# Patient Record
Sex: MALE | Race: WHITE | NOT HISPANIC OR LATINO | Employment: OTHER | URBAN - METROPOLITAN AREA
[De-identification: names, ages, dates, MRNs, and addresses within clinical notes are randomized per-mention and may not be internally consistent; named-entity substitution may affect disease eponyms.]

---

## 2019-11-01 ENCOUNTER — TELEPHONE (OUTPATIENT)
Dept: SCHEDULING | Facility: IMAGING CENTER | Age: 29
End: 2019-11-01

## 2019-11-12 ENCOUNTER — OFFICE VISIT (OUTPATIENT)
Dept: MEDICAL GROUP | Facility: CLINIC | Age: 29
End: 2019-11-12
Payer: MEDICAID

## 2019-11-12 VITALS
TEMPERATURE: 98.7 F | BODY MASS INDEX: 16.66 KG/M2 | HEART RATE: 62 BPM | RESPIRATION RATE: 16 BRPM | DIASTOLIC BLOOD PRESSURE: 72 MMHG | WEIGHT: 119 LBS | HEIGHT: 71 IN | OXYGEN SATURATION: 97 % | SYSTOLIC BLOOD PRESSURE: 112 MMHG

## 2019-11-12 DIAGNOSIS — G43.819 OTHER MIGRAINE WITHOUT STATUS MIGRAINOSUS, INTRACTABLE: ICD-10-CM

## 2019-11-12 DIAGNOSIS — Z00.00 ENCOUNTER FOR MEDICAL EXAMINATION TO ESTABLISH CARE: ICD-10-CM

## 2019-11-12 DIAGNOSIS — K21.9 GASTROESOPHAGEAL REFLUX DISEASE, ESOPHAGITIS PRESENCE NOT SPECIFIED: ICD-10-CM

## 2019-11-12 DIAGNOSIS — Z23 NEED FOR VACCINATION: ICD-10-CM

## 2019-11-12 DIAGNOSIS — R56.1 SEIZURE AFTER HEAD INJURY (HCC): ICD-10-CM

## 2019-11-12 PROBLEM — G43.909 MIGRAINE: Status: ACTIVE | Noted: 2019-11-12

## 2019-11-12 PROCEDURE — 90471 IMMUNIZATION ADMIN: CPT | Performed by: PHYSICIAN ASSISTANT

## 2019-11-12 PROCEDURE — 99204 OFFICE O/P NEW MOD 45 MIN: CPT | Mod: 25 | Performed by: PHYSICIAN ASSISTANT

## 2019-11-12 PROCEDURE — 90715 TDAP VACCINE 7 YRS/> IM: CPT | Performed by: PHYSICIAN ASSISTANT

## 2019-11-12 RX ORDER — METHOCARBAMOL 500 MG/1
1000 TABLET, FILM COATED ORAL 2 TIMES DAILY PRN
Qty: 60 TAB | Refills: 2 | Status: SHIPPED | OUTPATIENT
Start: 2019-11-12 | End: 2021-03-11

## 2019-11-12 RX ORDER — PRAZOSIN HYDROCHLORIDE 1 MG/1
1 CAPSULE ORAL NIGHTLY
COMMUNITY
End: 2021-11-01

## 2019-11-12 RX ORDER — PANTOPRAZOLE SODIUM 40 MG/1
40 TABLET, DELAYED RELEASE ORAL DAILY
Qty: 30 TAB | Refills: 1 | Status: SHIPPED | OUTPATIENT
Start: 2019-11-12 | End: 2019-11-26

## 2019-11-12 NOTE — ASSESSMENT & PLAN NOTE
"This is a chronic condition, new to this provider.  This patient does take ibuprofen on a daily basis, does reduce the \"red foods\" in his diet and does sit up for about 1 hour after eating in order to prevent worsening heartburn symptoms.  He states this was well controlled on ranitidine in the past and has not had medicine in the past couple of months for this.  "
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
## 2019-11-12 NOTE — ASSESSMENT & PLAN NOTE
This patient is from [place], [place] and has been unable to establish with a primary care provider recently due to access and insurance issues.  He requests to establish care at this time despite living over an hour drive from this clinic.
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
## 2019-11-12 NOTE — NON-PROVIDER
Migraines most days of the month  Sumatriptan and rescue      D/c prozac, fluoxatine, oxcarbazepine    Tetanus, no flu

## 2019-11-12 NOTE — ASSESSMENT & PLAN NOTE
This is a chronic condition, new to this provider.  This patient states that over the past few months, he has been experiencing nearly daily headaches that are not currently well controlled with ibuprofen and Tylenol.  He has taken topiramate in the past without good relief of his headaches.  He is also tried sumatriptan and which she feels did help alleviate his headaches.  He has tried topiramate in the past without good headache relief and does use marijuana for some relief of this problem.  He states his headache starts with a tightness sensation in the back of his neck and shoulders and is followed by photophobia, phonophobia and sensitivity to all stimulation.

## 2019-12-23 ENCOUNTER — OFFICE VISIT (OUTPATIENT)
Dept: MEDICAL GROUP | Facility: CLINIC | Age: 29
End: 2019-12-23
Payer: MEDICAID

## 2019-12-23 VITALS
WEIGHT: 118.8 LBS | OXYGEN SATURATION: 98 % | RESPIRATION RATE: 16 BRPM | HEART RATE: 90 BPM | BODY MASS INDEX: 16.63 KG/M2 | HEIGHT: 71 IN | TEMPERATURE: 98.5 F | SYSTOLIC BLOOD PRESSURE: 108 MMHG | DIASTOLIC BLOOD PRESSURE: 68 MMHG

## 2019-12-23 DIAGNOSIS — F17.200 SMOKES AND MOTIVATED TO QUIT: ICD-10-CM

## 2019-12-23 DIAGNOSIS — R07.81 PLEURODYNIA: ICD-10-CM

## 2019-12-23 DIAGNOSIS — Z20.5 EXPOSURE TO HEPATITIS B: ICD-10-CM

## 2019-12-23 DIAGNOSIS — G43.819 OTHER MIGRAINE WITHOUT STATUS MIGRAINOSUS, INTRACTABLE: ICD-10-CM

## 2019-12-23 PROCEDURE — 99214 OFFICE O/P EST MOD 30 MIN: CPT | Performed by: PHYSICIAN ASSISTANT

## 2019-12-23 RX ORDER — SUMATRIPTAN 25 MG/1
25-100 TABLET, FILM COATED ORAL
Qty: 10 TAB | Refills: 3 | Status: SHIPPED | OUTPATIENT
Start: 2019-12-23 | End: 2021-03-11

## 2019-12-23 ASSESSMENT — PAIN SCALES - GENERAL: PAINLEVEL: NO PAIN

## 2019-12-23 ASSESSMENT — PATIENT HEALTH QUESTIONNAIRE - PHQ9: CLINICAL INTERPRETATION OF PHQ2 SCORE: 0

## 2019-12-23 NOTE — PROGRESS NOTES
Chief Complaint   Patient presents with   • Paperwork     DMV   • Headache       HISTORY OF PRESENT ILLNESS: Patient is a 29 y.o. male established patient who presents today for evaluation and management of:    Smokes and motivated to quit  This patient has been a chronic smoker for some time now.  He does wish to quit and has previously tried patches and behavioral therapy without success.    Pleurodynia  This patient continues having episodes of severe chest pain on the left side that feel like a stabbing sensation and last for about 20 or 30 seconds and then go away.  He states that this pain seems to take his breath away but he is not short of breath when standing or walking during or after these attacks.  He states that the worst attack occurred the other night when he was sleeping and woke him from his sleep.  He has tried methocarbamol, ibuprofen and Tylenol with some relief of this    Migraine  This is a chronic condition, new to this provider.  This patient states that over the past few months, he has been experiencing nearly daily headaches that are not currently well controlled with ibuprofen and Tylenol.  He has taken topiramate in the past without good relief of his headaches.  He is also tried sumatriptan and which she feels did help alleviate his headaches.  He does use marijuana for some relief of this problem.  He states his headache starts with a tightness sensation in the back of his neck and shoulders and is followed by photophobia, phonophobia and sensitivity to all stimulation.  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    Current Outpatient Medications   Medication Sig Dispense Refill   • SUMAtriptan (IMITREX) 25 MG Tab tablet Take 1-4 Tabs by mouth Once PRN for Migraine for up to 1 dose. 10 Tab 3   • varenicline (CHANTIX STARTING MONTH TERI) 0.5 MG X 11 & 1 MG X 42 tablet Take 0.5 tab PO QAM for 4 days  Then, 0.5 tab PO BID for three days then, 1.0 tab PO BId thereafter. 56 Tab 3   • prazosin (MINIPRESS) 1 MG Cap Take 1 mg by mouth every evening.     • methocarbamol (ROBAXIN) 500 MG Tab Take 2 Tabs by mouth 2 times a day as needed. 60 Tab 2     No current facility-administered medications for this visit.        Social History     Tobacco Use   • Smoking status: Current Every Day Smoker     Packs/day: 2.00     Years: 10.00     Pack years: 20.00     Types: Cigarettes   • Smokeless tobacco: Never Used   Substance Use Topics   • Alcohol use: Not Currently   • Drug use: Yes     Frequency: 70.0 times per week     Types: Marijuana, Inhaled       Family Status   Relation Name Status   • Mo  Alive   • Sis  Alive   • Bro  Alive   • Bro  Alive   • Child  Alive   • Child  Alive     Family History   Problem Relation Age of Onset   • Drug abuse Mother    • Psychiatric Illness Brother         shizophrenia       Review of Systems: See HPI above.   Constitutional: Negative for fever, chills, weight loss and malaise.   HENT: Negative for ear pain, nosebleeds, congestion, sore throat and neck pain.    Eyes: Negative for blurred vision.   Respiratory: Negative for cough, sputum production and wheezing.    Cardiovascular: Negative for palpitations, orthopnea and leg swelling.   Gastrointestinal: Negative for nausea, vomiting and abdominal pain.   Genitourinary: Negative for  "dysuria, urgency and frequency.   Musculoskeletal: Negative for myalgias, back pain and joint pain.   Skin: Negative for rash and itching.   Neurological: Negative for dizziness, tingling, tremors, sensory change, focal weakness and positive for  headaches.       Exam:  /68 (BP Location: Right arm, Patient Position: Sitting, BP Cuff Size: Adult)   Pulse 90   Temp 36.9 °C (98.5 °F) (Temporal)   Resp 16   Ht 1.803 m (5' 11\")   Wt 53.9 kg (118 lb 12.8 oz)   SpO2 98%   Body mass index is 16.57 kg/m².  General:  Thin male in NAD  Head: is grossly normal.  Neck: Supple without masses. Thyroid is not visibly enlarged.  Pulmonary: Clear to ausculation. Normal effort. No rales, ronchi, or wheezing.  Cardiovascular: Regular rate and rhythm without murmur. Carotid pulses are intact and equal bilaterally.  Extremities: no clubbing, cyanosis, or edema.    Medical decision-making and discussion:  1. Exposure to hepatitis B    - HEPATITIS PANEL ACUTE(4 COMPONENTS); Future    2. Pleurodynia    - HOLTER - Cardiology Performed (48HR); Future  - DX-CHEST-2 VIEWS; Future    3. Other migraine without status migrainosus, intractable    - SUMAtriptan (IMITREX) 25 MG Tab tablet; Take 1-4 Tabs by mouth Once PRN for Migraine for up to 1 dose.  Dispense: 10 Tab; Refill: 3    4. Smokes and motivated to quit    - varenicline (CHANTIX STARTING MONTH TERI) 0.5 MG X 11 & 1 MG X 42 tablet; Take 0.5 tab PO QAM for 4 days  Then, 0.5 tab PO BID for three days then, 1.0 tab PO BId thereafter.  Dispense: 56 Tab; Refill: 3      Please note that this dictation was created using voice recognition software. I have made every reasonable attempt to correct obvious errors, but I expect that there are errors of grammar and possibly content that I did not discover before finalizing the note.      Return in about 4 weeks (around 1/20/2020) for headaches and smoking.  "

## 2019-12-23 NOTE — ASSESSMENT & PLAN NOTE
This is a chronic condition, new to this provider.  This patient states that over the past few months, he has been experiencing nearly daily headaches that are not currently well controlled with ibuprofen and Tylenol.  He has taken topiramate in the past without good relief of his headaches.  He is also tried sumatriptan and which she feels did help alleviate his headaches.  He does use marijuana for some relief of this problem.  He states his headache starts with a tightness sensation in the back of his neck and shoulders and is followed by photophobia, phonophobia and sensitivity to all stimulation.  He prefers to retry sumatriptan at this time

## 2019-12-23 NOTE — ASSESSMENT & PLAN NOTE
This patient has been a chronic smoker for some time now.  He does wish to quit and has previously tried patches and behavioral therapy without success.

## 2019-12-23 NOTE — ASSESSMENT & PLAN NOTE
This patient continues having episodes of severe chest pain on the left side that feel like a stabbing sensation and last for about 20 or 30 seconds and then go away.  He states that this pain seems to take his breath away but he is not short of breath when standing or walking during or after these attacks.  He states that the worst attack occurred the other night when he was sleeping and woke him from his sleep.  He has tried methocarbamol, ibuprofen and Tylenol with some relief of this

## 2019-12-23 NOTE — ASSESSMENT & PLAN NOTE
Since this patient was in [facility], he is aware that hepatitis B is commonly transmitted and presents and wishes to be tested for this at this time.

## 2019-12-31 ENCOUNTER — PATIENT MESSAGE (OUTPATIENT)
Dept: MEDICAL GROUP | Facility: CLINIC | Age: 29
End: 2019-12-31

## 2019-12-31 DIAGNOSIS — Z72.51 HIGH RISK SEXUAL BEHAVIOR, UNSPECIFIED TYPE: ICD-10-CM

## 2020-01-01 NOTE — TELEPHONE ENCOUNTER
From: Jose Alejandro Bone  To: Laly Maxwell P.A.-C.  Sent: 12/31/2019 3:26 PM PST  Subject: Non-Urgent Medical Question    Are you gonna be able to get a lab order for Hep tests? If so is it possible just to have a full STD and HIV test at the same time?    Only 1 I really am concerned with is Hep B because of [facility].     [place]

## 2021-03-11 ENCOUNTER — OFFICE VISIT (OUTPATIENT)
Dept: MEDICAL GROUP | Facility: MEDICAL CENTER | Age: 31
End: 2021-03-11
Attending: PHYSICIAN ASSISTANT
Payer: MEDICAID

## 2021-03-11 VITALS
DIASTOLIC BLOOD PRESSURE: 60 MMHG | HEART RATE: 92 BPM | SYSTOLIC BLOOD PRESSURE: 106 MMHG | TEMPERATURE: 98.7 F | RESPIRATION RATE: 16 BRPM | BODY MASS INDEX: 17.31 KG/M2 | WEIGHT: 120.9 LBS | HEIGHT: 70 IN | OXYGEN SATURATION: 98 %

## 2021-03-11 DIAGNOSIS — R07.9 CHRONIC CHEST PAIN: ICD-10-CM

## 2021-03-11 DIAGNOSIS — Z02.89 ENCOUNTER FOR COMPLETION OF FORM WITH PATIENT: ICD-10-CM

## 2021-03-11 DIAGNOSIS — G89.29 CHRONIC CHEST PAIN: ICD-10-CM

## 2021-03-11 PROCEDURE — 99214 OFFICE O/P EST MOD 30 MIN: CPT | Performed by: PHYSICIAN ASSISTANT

## 2021-03-11 PROCEDURE — 99203 OFFICE O/P NEW LOW 30 MIN: CPT | Performed by: PHYSICIAN ASSISTANT

## 2021-03-11 RX ORDER — HYDROXYZINE PAMOATE 25 MG/1
CAPSULE ORAL
COMMUNITY
Start: 2021-02-14 | End: 2021-11-01

## 2021-03-11 RX ORDER — VENLAFAXINE HYDROCHLORIDE 150 MG/1
CAPSULE, EXTENDED RELEASE ORAL
COMMUNITY
Start: 2021-03-04 | End: 2021-11-01

## 2021-03-11 RX ORDER — VENLAFAXINE HYDROCHLORIDE 75 MG/1
CAPSULE, EXTENDED RELEASE ORAL
COMMUNITY
Start: 2021-02-14 | End: 2021-03-11

## 2021-03-11 RX ORDER — MIRTAZAPINE 15 MG/1
TABLET, FILM COATED ORAL
COMMUNITY
Start: 2021-02-14 | End: 2021-11-01

## 2021-03-11 ASSESSMENT — ENCOUNTER SYMPTOMS
WEAKNESS: 0
DOUBLE VISION: 0
BLURRED VISION: 0
CHILLS: 0
DIZZINESS: 0
BLOOD IN STOOL: 0
SHORTNESS OF BREATH: 1
WHEEZING: 0
CLAUDICATION: 0
COUGH: 0
PALPITATIONS: 0
CONSTIPATION: 0
TINGLING: 0
FEVER: 0
DIARRHEA: 0
WEIGHT LOSS: 0
VOMITING: 0
PHOTOPHOBIA: 0
HEADACHES: 0
NAUSEA: 0

## 2021-03-11 ASSESSMENT — PATIENT HEALTH QUESTIONNAIRE - PHQ9: CLINICAL INTERPRETATION OF PHQ2 SCORE: 0
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
## 2021-03-12 NOTE — ASSESSMENT & PLAN NOTE
[place] presents today to establish care. He reports a chronic history of left sided chest pain. He states that it is typically secondary to his anxiety however, since getting his anxiety under control, he has noticed recently that it has continued and happens intermittently and the episodes only last a few minutes before resolution.  He describes the pain as sharp.  Associated symptoms include shortness of breath due to the pain.  He reports a history of severe GERD and GI issues.  Denies personal of cardiac or pulmonary conditions.  No red flag signs on exam.  Vitals are stable.
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
## 2021-11-01 ENCOUNTER — OFFICE VISIT (OUTPATIENT)
Dept: MEDICAL GROUP | Facility: MEDICAL CENTER | Age: 31
End: 2021-11-01
Attending: PHYSICIAN ASSISTANT
Payer: COMMERCIAL

## 2021-11-01 VITALS
HEIGHT: 69 IN | TEMPERATURE: 98.4 F | OXYGEN SATURATION: 95 % | BODY MASS INDEX: 18.81 KG/M2 | DIASTOLIC BLOOD PRESSURE: 70 MMHG | RESPIRATION RATE: 17 BRPM | HEART RATE: 89 BPM | WEIGHT: 127 LBS | SYSTOLIC BLOOD PRESSURE: 108 MMHG

## 2021-11-01 DIAGNOSIS — G43.819 OTHER MIGRAINE WITHOUT STATUS MIGRAINOSUS, INTRACTABLE: ICD-10-CM

## 2021-11-01 DIAGNOSIS — Z02.89 ENCOUNTER FOR COMPLETION OF FORM WITH PATIENT: ICD-10-CM

## 2021-11-01 DIAGNOSIS — F41.9 ANXIETY: ICD-10-CM

## 2021-11-01 PROCEDURE — 99213 OFFICE O/P EST LOW 20 MIN: CPT | Performed by: PHYSICIAN ASSISTANT

## 2021-11-01 PROCEDURE — 99214 OFFICE O/P EST MOD 30 MIN: CPT | Performed by: PHYSICIAN ASSISTANT

## 2021-11-01 RX ORDER — PROPRANOLOL HYDROCHLORIDE 10 MG/1
10 TABLET ORAL 3 TIMES DAILY PRN
Qty: 90 TABLET | Refills: 1 | Status: SHIPPED | OUTPATIENT
Start: 2021-11-01

## 2021-11-01 NOTE — PROGRESS NOTES
Chief Complaint   Patient presents with   • Follow-Up   • Paperwork     Subjective:     HPI:   Jose Alejandro Bone is a 31 y.o. male here to discuss the evaluation and management of:    Encounter for completion of form with patient  [place] presents today for follow-up.  He is requesting for paperwork to be filled out for accommodations at work.    Anxiety  [place] presents today for follow-up. He has a history of anxiety.  He reports that he discontinued his daily anxiety medication as he did not want to be on something daily.  He is no longer attending therapy due to his work schedule.  He would like to discuss options for as needed treatment for his anxiety.  No SI or HI.    Migraine  [place] presents today for follow up. He has a history of migraines.  He reports that he has been experiencing 4-7 migraines a week.  He has trialed sumatriptan in the past which was beneficial for him but unfortunately, he experienced a side effect of somnolence making it difficult for him to function throughout his day.  As he is a , he will not be able to take this medication on the job.    ROS  See HPI.     No Known Allergies    Current medicines (including changes today)  Current Outpatient Medications   Medication Sig Dispense Refill   • propranolol (INDERAL) 10 MG Tab Take 1 Tablet by mouth 3 times a day as needed. 90 Tablet 1   • Riboflavin 400 MG Cap Take 1 Capsule by mouth every day. 30 Capsule 5     No current facility-administered medications for this visit.       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   • Exposure to hepatitis B 12/23/2019   • Migraine 11/12/2019   • Gastroesophageal reflux disease 11/12/2019   • Encounter for completion of form with patient 11/12/2019   • Anxiety 08/28/2013   • PTSD (post-traumatic stress disorder) 08/28/2012   • Seizure after head injury (HCC) 12/24/2010     Family History   Problem Relation Age of Onset   • Drug abuse Mother    • Psychiatric Illness Brother         micaela      Objective:     /70 (BP Location: Left arm, Patient Position: Sitting, BP Cuff Size: Adult)   Pulse 89   Temp 36.9 °C (98.4 °F) (Skin)   Resp 17   Ht 1.753 m (5' 9\")   Wt 57.6 kg (127 lb)   SpO2 95%  Body mass index is 18.75 kg/m².    Physical Exam:  Physical Exam  Vitals reviewed.   Constitutional:       Appearance: Normal appearance.   HENT:      Head: Normocephalic.      Right Ear: External ear normal.      Left Ear: External ear normal.   Cardiovascular:      Rate and Rhythm: Normal rate and regular rhythm.      Heart sounds: Normal heart sounds.   Pulmonary:      Effort: Pulmonary effort is normal.      Breath sounds: Normal breath sounds.   Musculoskeletal:         General: Normal range of motion.      Cervical back: Normal range of motion.   Neurological:      General: No focal deficit present.      Mental Status: He is alert and oriented to person, place, and time.   Psychiatric:         Mood and Affect: Mood normal.         Behavior: Behavior normal.         Thought Content: Thought content normal.       Assessment and Plan:     The following treatment plan was discussed:    1. Encounter for completion of form with patient  - Accommodation employer paperwork filled out to completion during his visit today.  A copy was scanned into his chart and hard copy given back to the patient.    2. Anxiety  - This is a chronic condition.  - Plan: Trial propranolol as needed for anxiety.  Patient has been " educated on the use and potential side effect profile of this medication.  I will check in with the patient in a few weeks via [employer] to see how he is tolerating the medication.  - propranolol (INDERAL) 10 MG Tab; Take 1 Tablet by mouth 3 times a day as needed.  Dispense: 90 Tablet; Refill: 1    3. Other migraine without status migrainosus, intractable  - This is a chronic condition.  - Plan: Trial prophylactic therapy with riboflavin once daily.  The goal is to reduce number of migraines per week.  Patient has been educated on the use and potential side effect profile of this medication.  I will check in with the patient via [employer] in 4 weeks to see how he is tolerating this medication.  - Riboflavin 400 MG Cap; Take 1 Capsule by mouth every day.  Dispense: 30 Capsule; Refill: 5     Any change or worsening of signs or symptoms, patient encouraged to follow-up or report to emergency room for further evaluation. Patient verbalizes understanding and agrees.    Follow-Up: Return if symptoms worsen or fail to improve.      PLEASE NOTE: This dictation was created using voice recognition software. I have made every reasonable attempt to correct obvious errors, but I expect that there are errors of grammar and possibly content that I did not discover before finalizing the note.

## 2021-11-01 NOTE — ASSESSMENT & PLAN NOTE
[place] presents today for follow up. He has a history of migraines.  He reports that he has been experiencing 4-7 migraines a week.  He has trialed sumatriptan in the past which was beneficial for him but unfortunately, he experienced a side effect of somnolence making it difficult for him to function throughout his day.  As he is a , he will not be able to take this medication on the job.

## 2021-11-01 NOTE — ASSESSMENT & PLAN NOTE
[place] presents today for follow-up. He has a history of anxiety.  He reports that he discontinued his daily anxiety medication as he did not want to be on something daily.  He is no longer attending therapy due to his work schedule.  He would like to discuss options for as needed treatment for his anxiety.  No SI or HI.

## 2021-11-01 NOTE — ASSESSMENT & PLAN NOTE
[place] presents today for follow-up.  He is requesting for paperwork to be filled out for accommodations at work.

## 2022-01-11 ENCOUNTER — OFFICE VISIT (OUTPATIENT)
Dept: MEDICAL GROUP | Facility: MEDICAL CENTER | Age: 32
End: 2022-01-11
Attending: PHYSICIAN ASSISTANT
Payer: COMMERCIAL

## 2022-01-11 VITALS
BODY MASS INDEX: 18.53 KG/M2 | TEMPERATURE: 98.7 F | DIASTOLIC BLOOD PRESSURE: 72 MMHG | WEIGHT: 125.1 LBS | SYSTOLIC BLOOD PRESSURE: 100 MMHG | RESPIRATION RATE: 18 BRPM | HEART RATE: 86 BPM | HEIGHT: 69 IN | OXYGEN SATURATION: 97 %

## 2022-01-11 DIAGNOSIS — Z02.89 ENCOUNTER FOR COMPLETION OF FORM WITH PATIENT: ICD-10-CM

## 2022-01-11 PROCEDURE — 99212 OFFICE O/P EST SF 10 MIN: CPT | Performed by: PHYSICIAN ASSISTANT

## 2022-01-11 PROCEDURE — 7101 PR PHYSICAL: Performed by: PHYSICIAN ASSISTANT

## 2022-01-11 NOTE — ASSESSMENT & PLAN NOTE
[place] presents today for follow up. He would like for his ADA paperwork to be filled out during his visit today.   [facility]  INFECTIOUS DISEASES AND INTERNAL MEDICINE at [place]  =======================================================  Mitchell Brown MD  Diplomates American Board of Internal Medicine and Infectious Diseases  Tel  [phone]  Fax [phone]  =======================================================    [id number]  FARHAT SLADE   follow up:   RIGHT hallux infection     pending MRI of foot.     no fevers    =======================================================  REVIEW OF SYSTEMS:  CONSTITUTIONAL:  No Fever or chills  HEENT:  No diplopia or blurred vision.  No earache, sore throat or runny nose.  CARDIOVASCULAR:  No pressure, squeezing, strangling, tightness, heaviness or aching about the chest, neck, axilla or epigastrium.  RESPIRATORY:  No cough, shortness of breath  GASTROINTESTINAL:  No nausea, vomiting or diarrhea.  GENITOURINARY:  No dysuria, frequency or urgency. No Blood in urine  MUSCULOSKELETAL:  no joint aches, no muscle pain  SKIN:  No change in skin, hair or nails.  NEUROLOGIC:  No Headaches, seizures or weakness.  PSYCHIATRIC:  No disorder of thought or mood.  ENDOCRINE:  No heat or cold intolerance  HEMATOLOGICAL:  No easy bruising or bleeding.   =======================================================    Allergies  Biaxin (Vomiting)        ======================================================  Physical Exam:  ============     General:  No acute distress.  Eye: Pupils are equal, round and reactive to light, Extraocular movements are intact, Normal conjunctiva.  HENT: Normocephalic, Oral mucosa is moist, No pharyngeal erythema, No sinus tenderness.  Neck: Supple, No lymphadenopathy.  Respiratory: Lungs are clear to auscultation, Respirations are non-labored.  Cardiovascular: Normal rate, Regular rhythm,   Gastrointestinal: Soft, Non-tender, Non-distended, Normal bowel sounds.  Genitourinary: No costovertebral angle tenderness.  Lymphatics: No lymphadenopathy neck,   Musculoskeletal: Normal range of motion, Normal strength.  Integumentary:  RIGHT HALLUX with enlargement and discoloration; debridement of plantar skin.  + foul smell  Neurologic: Alert, Oriented, No focal deficits, Cranial Nerves II-XII are grossly intact.  POOR SENSATION OF Pain on foot bilaterally.  Psychiatric: Appropriate mood & affect.    =======================================================  Vitals:  ============  T(F): 97.8 (15 Sep 2020 08:09), Max: 98.8 (14 Sep 2020 21:17)  HR: 94 (15 Sep 2020 08:09)  BP: 159/75 (15 Sep 2020 08:09)  RR: 18 (15 Sep 2020 08:09)  SpO2: 97% (15 Sep 2020 08:09) (93% - 97%)  temp max in last 48H T(F): , Max: 101.7 (09-14-20 @ 05:45)    =======================================================  Current Antibiotics:  piperacillin/tazobactam IVPB.. 3.375 Gram(s) IV Intermittent every 8 hours  vancomycin  IVPB 1000 milliGRAM(s) IV Intermittent every 12 hours    Other medications:  aspirin  chewable 81 milliGRAM(s) Oral daily  atorvastatin 20 milliGRAM(s) Oral at bedtime  dextrose 5%. 1000 milliLiter(s) IV Continuous <Continuous>  dextrose 50% Injectable 12.5 Gram(s) IV Push once  dextrose 50% Injectable 25 Gram(s) IV Push once  dextrose 50% Injectable 25 Gram(s) IV Push once  heparin   Injectable 5000 Unit(s) SubCutaneous every 8 hours  insulin glargine Injectable (LANTUS) 12 Unit(s) SubCutaneous at bedtime  insulin lispro (HumaLOG) corrective regimen sliding scale   SubCutaneous three times a day before meals  insulin lispro Injectable (HumaLOG) 5 Unit(s) SubCutaneous before breakfast  insulin lispro Injectable (HumaLOG) 5 Unit(s) SubCutaneous before lunch  insulin lispro Injectable (HumaLOG) 5 Unit(s) SubCutaneous before dinner  lisinopril 5 milliGRAM(s) Oral daily      =======================================================  Labs:                        12.1   9.99  )-----------( 157      ( 15 Sep 2020 08:04 )             36.1      09-14    131<L>  |  96<L>  |  18.0  ----------------------------<  150<H>  3.7   |  23.0  |  0.83    Ca    8.7      14 Sep 2020 02:25    TPro  6.9  /  Alb  3.4  /  TBili  0.8  /  DBili  x   /  AST  14  /  ALT  16  /  AlkPhos  165<H>  09-13      Culture - Urine (collected 09-14-20 @ 08:14)  Source: .Urine Clean Catch (Midstream)  Final Report (09-15-20 @ 07:38):    No growth      Creatinine, Serum: 0.83 mg/dL (09-14-20 @ 02:25)  Creatinine, Serum: 0.94 mg/dL (09-13-20 @ 20:24)            WBC Count: 9.99 K/uL (09-15-20 @ 08:04)  WBC Count: 16.13 K/uL (09-14-20 @ 02:25)  WBC Count: 19.48 K/uL (09-13-20 @ 20:24)      COVID-19 PCR: NotDetec (09-13-20 @ 21:35)      Alkaline Phosphatase, Serum: 165 U/L (09-13-20 @ 20:24)  Alanine Aminotransferase (ALT/SGPT): 16 U/L (09-13-20 @ 20:24)  Aspartate Aminotransferase (AST/SGOT): 14 U/L (09-13-20 @ 20:24)  Bilirubin Total, Serum: 0.8 mg/dL (09-13-20 @ 20:24)         < from: Xray Toes, Right Foot (09.13.20 @ 20:02) >   EXAM:  TOE(S)-RIGHT FOOT                          PROCEDURE DATE:  09/13/2020          INTERPRETATION:  AP, lateral, and oblique views of the RIGHT first hallux are submitted.    Clinical data; trauma with pain.    There is diffuse soft tissue swelling of the first toe with subcutaneous and air collections on the plantar surface. Adjacent to distal and proximal first digit osseous structures show no gross of fracture or osteolytic lesion./  IMPRESSION: Diffuse soft tissue swelling with subcutaneous air. No fracture or gross osteolytic lesion.    If osteomyelitis is considered, despite conservative therapy, and soft tissue / bone infection requires further assessment, follow-up MRI recommended.          YIFAN PUENTES M.D., ATTENDING RADIOLOGIST  This document has been electronically signed. Sep 14 2020  1:48PM    < end of copied text >

## 2022-01-11 NOTE — PROGRESS NOTES
"Chief Complaint   Patient presents with   • Paperwork     Subjective:     HPI:   Jose Alejandro Bone is a 31 y.o. male here to discuss the evaluation and management of:    Encounter for completion of form with patient  [place] presents today for follow up. He would like for his ADA paperwork to be filled out during his visit today.    ROS  See HPI.     No Known Allergies    Current medicines (including changes today)  Current Outpatient Medications   Medication Sig Dispense Refill   • propranolol (INDERAL) 10 MG Tab Take 1 Tablet by mouth 3 times a day as needed. 90 Tablet 1   • Riboflavin 400 MG Cap Take 1 Capsule by mouth every day. 30 Capsule 5     No current facility-administered medications for this visit.       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   • Exposure to hepatitis B 12/23/2019   • Migraine 11/12/2019   • Gastroesophageal reflux disease 11/12/2019   • Encounter for completion of form with patient 11/12/2019   • Anxiety 08/28/2013   • PTSD (post-traumatic stress disorder) 08/28/2012   • Seizure after head injury (HCC) 12/24/2010       Family History   Problem Relation Age of Onset   • Drug abuse Mother    • Psychiatric Illness Brother         jose fphrenia        Objective:     /72 (BP Location: Left arm, Patient Position: Sitting, BP Cuff Size: Adult)   Pulse 86   Temp 37.1 °C (98.7 °F) (Skin)   Resp 18   Ht 1.753 m (5' 9\")   Wt 56.7 kg (125 lb 1.6 oz)   SpO2 97%  Body mass index is 18.47 " kg/m².    Physical Exam:  Physical Exam  Vitals reviewed.   Constitutional:       Appearance: Normal appearance.   HENT:      Head: Normocephalic.   Neurological:      Mental Status: He is alert.       Assessment and Plan:     The following treatment plan was discussed:    1. Encounter for completion of form with patient   - ADA paperwork was filled out to completion during his visit today. A copy has been scanned into his chart. Hard copy was given back to the patient.     Any change or worsening of signs or symptoms, patient encouraged to follow-up or report to emergency room for further evaluation. Patient verbalizes understanding and agrees.    Follow-Up: Return if symptoms worsen or fail to improve.      PLEASE NOTE: This dictation was created using voice recognition software. I have made every reasonable attempt to correct obvious errors, but I expect that there are errors of grammar and possibly content that I did not discover before finalizing the note.

## 2022-01-14 ENCOUNTER — PATIENT MESSAGE (OUTPATIENT)
Dept: MEDICAL GROUP | Facility: MEDICAL CENTER | Age: 32
End: 2022-01-14

## 2022-01-14 DIAGNOSIS — Z11.59 SCREENING FOR VIRAL DISEASE: ICD-10-CM

## 2022-01-14 DIAGNOSIS — J06.9 VIRAL URI: ICD-10-CM

## 2024-07-19 ENCOUNTER — OFFICE VISIT (OUTPATIENT)
Dept: URGENT CARE | Facility: PHYSICIAN GROUP | Age: 34
End: 2024-07-19
Payer: COMMERCIAL

## 2024-07-19 ENCOUNTER — APPOINTMENT (OUTPATIENT)
Dept: URGENT CARE | Facility: PHYSICIAN GROUP | Age: 34
End: 2024-07-19

## 2024-07-19 VITALS
HEIGHT: 70 IN | TEMPERATURE: 98.5 F | BODY MASS INDEX: 17.78 KG/M2 | RESPIRATION RATE: 16 BRPM | HEART RATE: 70 BPM | DIASTOLIC BLOOD PRESSURE: 68 MMHG | SYSTOLIC BLOOD PRESSURE: 114 MMHG | WEIGHT: 124.2 LBS | OXYGEN SATURATION: 98 %

## 2024-07-19 DIAGNOSIS — Z87.898 HISTORY OF SEIZURES: ICD-10-CM

## 2024-07-19 DIAGNOSIS — G44.209 TENSION HEADACHE: ICD-10-CM

## 2024-07-19 PROCEDURE — 3074F SYST BP LT 130 MM HG: CPT | Performed by: FAMILY MEDICINE

## 2024-07-19 PROCEDURE — 3078F DIAST BP <80 MM HG: CPT | Performed by: FAMILY MEDICINE

## 2024-07-19 PROCEDURE — 99203 OFFICE O/P NEW LOW 30 MIN: CPT | Performed by: FAMILY MEDICINE

## 2024-07-19 RX ORDER — KETOROLAC TROMETHAMINE 30 MG/ML
30 INJECTION, SOLUTION INTRAMUSCULAR; INTRAVENOUS ONCE
Status: COMPLETED | OUTPATIENT
Start: 2024-07-19 | End: 2024-07-19

## 2024-07-19 RX ADMIN — KETOROLAC TROMETHAMINE 30 MG: 30 INJECTION, SOLUTION INTRAMUSCULAR; INTRAVENOUS at 14:39

## 2024-07-26 ENCOUNTER — TELEPHONE (OUTPATIENT)
Dept: HEALTH INFORMATION MANAGEMENT | Facility: OTHER | Age: 34
End: 2024-07-26
Payer: COMMERCIAL

## 2025-06-21 ENCOUNTER — APPOINTMENT (OUTPATIENT)
Dept: RADIOLOGY | Facility: IMAGING CENTER | Age: 35
End: 2025-06-21
Attending: FAMILY MEDICINE
Payer: COMMERCIAL

## 2025-06-21 ENCOUNTER — OFFICE VISIT (OUTPATIENT)
Dept: URGENT CARE | Facility: PHYSICIAN GROUP | Age: 35
End: 2025-06-21
Payer: COMMERCIAL

## 2025-06-21 ENCOUNTER — RESULTS FOLLOW-UP (OUTPATIENT)
Dept: URGENT CARE | Facility: PHYSICIAN GROUP | Age: 35
End: 2025-06-21

## 2025-06-21 VITALS
BODY MASS INDEX: 18.32 KG/M2 | WEIGHT: 128 LBS | TEMPERATURE: 97.8 F | HEIGHT: 70 IN | HEART RATE: 96 BPM | SYSTOLIC BLOOD PRESSURE: 116 MMHG | DIASTOLIC BLOOD PRESSURE: 78 MMHG | OXYGEN SATURATION: 97 % | RESPIRATION RATE: 18 BRPM

## 2025-06-21 DIAGNOSIS — G43.809 OTHER MIGRAINE WITHOUT STATUS MIGRAINOSUS, NOT INTRACTABLE: Primary | ICD-10-CM

## 2025-06-21 DIAGNOSIS — G62.9 NEUROPATHY: ICD-10-CM

## 2025-06-21 LAB
GLUCOSE BLD-MCNC: 85 MG/DL (ref 65–99)
HBA1C MFR BLD: 5.4 % (ref ?–5.8)
POCT INT CON NEG: NEGATIVE
POCT INT CON POS: POSITIVE

## 2025-06-21 PROCEDURE — 83036 HEMOGLOBIN GLYCOSYLATED A1C: CPT | Performed by: FAMILY MEDICINE

## 2025-06-21 PROCEDURE — 99214 OFFICE O/P EST MOD 30 MIN: CPT | Performed by: FAMILY MEDICINE

## 2025-06-21 PROCEDURE — 3074F SYST BP LT 130 MM HG: CPT | Performed by: FAMILY MEDICINE

## 2025-06-21 PROCEDURE — 82962 GLUCOSE BLOOD TEST: CPT | Performed by: FAMILY MEDICINE

## 2025-06-21 PROCEDURE — 73660 X-RAY EXAM OF TOE(S): CPT | Mod: TC,FY,LT | Performed by: RADIOLOGY

## 2025-06-21 PROCEDURE — 3078F DIAST BP <80 MM HG: CPT | Performed by: FAMILY MEDICINE

## 2025-06-21 RX ORDER — SUMATRIPTAN 6 MG/.5ML
6 INJECTION, SOLUTION SUBCUTANEOUS ONCE
Status: COMPLETED | OUTPATIENT
Start: 2025-06-21 | End: 2025-06-21

## 2025-06-21 RX ORDER — SUMATRIPTAN SUCCINATE 100 MG/1
50 TABLET ORAL EVERY 8 HOURS PRN
Qty: 10 TABLET | Refills: 2 | Status: SHIPPED | OUTPATIENT
Start: 2025-06-21

## 2025-06-21 RX ADMIN — SUMATRIPTAN 6 MG: 6 INJECTION, SOLUTION SUBCUTANEOUS at 14:19

## 2025-06-21 NOTE — PROGRESS NOTES
"Chief Complaint   Patient presents with    Migraine     Called out of work and needs a note                    Migraine   Pt has hx migraine headaches.        Has migraine today and unable to go to work     The pain does not radiate. The pain quality is similar to prior headaches. The quality of the pain is described as sharp. Associated symptoms include dizziness, nausea, phonophobia and photophobia. Pertinent negatives include no abdominal pain or fever. The symptoms are aggravated by activity and bright light. Patient has tried acetaminophen for the symptoms. The treatment provided no relief.  past medical history is significant for migraine headaches.       #2.  C/o dec sensation over left great toe x 2 mth.   Denies injury or trauma.    No weakness.         Social History[1]        Past Medical History[2]        Review of Systems   Constitutional: Negative for fever and chills.   Eyes: Positive for photophobia.   Respiratory: Negative for shortness of breath.    Cardiovascular: Negative for chest pain and palpitations.   Gastrointestinal:  Negative for abdominal pain.   Skin: Negative for rash.    Psychiatric/Behavioral: The patient is not nervous/anxious.    All other systems reviewed and are negative.         Objective:     /78   Pulse 96   Temp 36.6 °C (97.8 °F) (Temporal)   Resp 18   Ht 1.778 m (5' 10\")   Wt 58.1 kg (128 lb)   SpO2 97%     Physical Exam   Constitutional: pt is oriented to person, place, and time.  Pt appears well-developed and well-nourished. No distress.   HENT:   Head: Normocephalic and atraumatic.   Mouth/Throat: Oropharynx is clear and moist. No oropharyngeal exudate.   Eyes: Conjunctivae and EOM are normal. Pupils are equal, round, and reactive to light. Right eye exhibits no discharge. Left eye exhibits no discharge. No scleral icterus.   Neck: Neck supple.   Cardiovascular: Normal rate and regular rhythm.    Pulmonary/Chest: Effort normal.   Lymphadenopathy:     Pt has no " cervical adenopathy.   Neurologic: Alert and oriented. Cranial nerves II-XII intact, EOMs intact, no tongue deviation, PERRL, no facial asymmetry to motor or sensation, symmetric palate, normal finger-to-nose test, no pronator drift. No focal motor deficits. Symmetric reflexes. Normal station and gait, normal tandem walk. Coordination normal.   Left great toe:   full AROM.   Cap refill < 2 sec.   Sensation intact to light touch    Skin: Skin is warm. Pt is not diaphoretic. No erythema. No pallor.   Psychiatric:  behavior is normal.   Nursing note and vitals reviewed.         Details    Reading Physician Reading Date Result Priority   Beka Nam M.D.  [phone] 6/21/2025      Narrative & Impression     6/21/2025 2:19 PM     HISTORY/REASON FOR EXAM: .  Toe numbness     TECHNIQUE/EXAM DESCRIPTION AND NUMBER OF VIEWS:  3 views of the LEFT toes.     COMPARISON: None     FINDINGS:  There is no definite fracture, dislocation, significant joint space loss, or erosion.     IMPRESSION:     No definite acute osseous abnormality.        Exam Ended: 06/21/25  2:33 PM Last Resulted: 06/21/25  2:41 PM              Assessment/Plan:         1. Other migraine without status migrainosus, not intractable (Primary)  Improved with imitrex    - SUMAtriptan Succinate (Imitrex) injection 6 mg    2. Neuropathy     Unclear etiology       X-rays were personally reviewed by myself.   There is no fracture or arthritic changes   noted.      A1c 5.4 - he is not diabetic.       Advised f/u PCP      - DX-TOE(S) 2+ LEFT; Future  - POCT Glucose  - POCT  A1C      Differential diagnosis, natural history, supportive care, and indications for immediate follow-up discussed. All questions answered. Patient agrees with the plan of care.     Follow-up as needed if symptoms worsen or fail to improve to PCP, Urgent care or Emergency Room.     I have personally reviewed prior external notes and test results pertinent to today's visit.  I have independently  reviewed and interpreted all diagnostics ordered during this urgent care acute visit.          [1]   Social History  Tobacco Use    Smoking status: Every Day     Current packs/day: 2.00     Average packs/day: 2.0 packs/day for 10.0 years (20.0 ttl pk-yrs)     Types: Cigarettes    Smokeless tobacco: Never   Vaping Use    Vaping status: Never Used   Substance Use Topics    Alcohol use: Not Currently     Comment: OCC    Drug use: Yes     Frequency: 70.0 times per week     Types: Marijuana, Inhaled   [2]   Past Medical History:  Diagnosis Date    Bipolar 2 disorder (HCC) 6/18/2009    Epilepsy ([facility]) 2010    GERD (gastroesophageal reflux disease)     Migraine     PTSD (post-traumatic stress disorder) 2010    bad childhood

## 2025-06-21 NOTE — LETTER
June 21, 2025    To Whom It May Concern:         This is confirmation that Jose Alejandro Bone attended his scheduled appointment with Omer Rodriguez M.D. on 6/21/25.      Please excuse absence due to illness on 6/21.            If you have any questions please do not hesitate to call me at the phone number listed below.    Sincerely,          Omer Rodriguez M.D.  [phone]

## 2025-08-18 ENCOUNTER — OFFICE VISIT (OUTPATIENT)
Dept: MEDICAL GROUP | Facility: CLINIC | Age: 35
End: 2025-08-18
Payer: COMMERCIAL

## 2025-08-18 VITALS
DIASTOLIC BLOOD PRESSURE: 70 MMHG | TEMPERATURE: 98.9 F | HEIGHT: 70 IN | SYSTOLIC BLOOD PRESSURE: 114 MMHG | HEART RATE: 85 BPM | BODY MASS INDEX: 18.51 KG/M2 | WEIGHT: 129.3 LBS | OXYGEN SATURATION: 98 %

## 2025-08-18 DIAGNOSIS — G43.719 INTRACTABLE CHRONIC MIGRAINE WITHOUT AURA AND WITHOUT STATUS MIGRAINOSUS: Primary | ICD-10-CM

## 2025-08-18 DIAGNOSIS — K21.9 GASTROESOPHAGEAL REFLUX DISEASE, UNSPECIFIED WHETHER ESOPHAGITIS PRESENT: ICD-10-CM

## 2025-08-18 DIAGNOSIS — R07.9 CHEST PAIN, UNSPECIFIED TYPE: ICD-10-CM

## 2025-08-18 DIAGNOSIS — F41.9 ANXIETY: ICD-10-CM

## 2025-08-18 PROCEDURE — 3074F SYST BP LT 130 MM HG: CPT | Mod: GC

## 2025-08-18 PROCEDURE — 3078F DIAST BP <80 MM HG: CPT | Mod: GC

## 2025-08-18 PROCEDURE — 99204 OFFICE O/P NEW MOD 45 MIN: CPT | Mod: GC

## 2025-08-18 RX ORDER — PROPRANOLOL HYDROCHLORIDE 10 MG/1
20 TABLET ORAL 2 TIMES DAILY
Qty: 120 TABLET | Refills: 2 | Status: CANCELLED | OUTPATIENT
Start: 2025-08-18

## 2025-08-18 RX ORDER — OMEPRAZOLE 20 MG/1
20 CAPSULE, DELAYED RELEASE ORAL DAILY
Qty: 30 CAPSULE | Refills: 1 | Status: SHIPPED | OUTPATIENT
Start: 2025-08-18

## 2025-08-18 RX ORDER — PROPRANOLOL HYDROCHLORIDE 40 MG/1
40 TABLET ORAL NIGHTLY
Qty: 30 TABLET | Refills: 1 | Status: SHIPPED | OUTPATIENT
Start: 2025-08-18

## 2025-08-18 SDOH — ECONOMIC STABILITY: INCOME INSECURITY: HOW HARD IS IT FOR YOU TO PAY FOR THE VERY BASICS LIKE FOOD, HOUSING, MEDICAL CARE, AND HEATING?: NOT HARD AT ALL

## 2025-08-18 SDOH — ECONOMIC STABILITY: FOOD INSECURITY: WITHIN THE PAST 12 MONTHS, THE FOOD YOU BOUGHT JUST DIDN'T LAST AND YOU DIDN'T HAVE MONEY TO GET MORE.: NEVER TRUE

## 2025-08-18 SDOH — ECONOMIC STABILITY: HOUSING INSECURITY
IN THE LAST 12 MONTHS, WAS THERE A TIME WHEN YOU DID NOT HAVE A STEADY PLACE TO SLEEP OR SLEPT IN A SHELTER (INCLUDING NOW)?: NO

## 2025-08-18 SDOH — ECONOMIC STABILITY: INCOME INSECURITY: IN THE LAST 12 MONTHS, WAS THERE A TIME WHEN YOU WERE NOT ABLE TO PAY THE MORTGAGE OR RENT ON TIME?: NO

## 2025-08-18 SDOH — ECONOMIC STABILITY: FOOD INSECURITY: WITHIN THE PAST 12 MONTHS, YOU WORRIED THAT YOUR FOOD WOULD RUN OUT BEFORE YOU GOT MONEY TO BUY MORE.: NEVER TRUE

## 2025-08-18 SDOH — HEALTH STABILITY: PHYSICAL HEALTH: ON AVERAGE, HOW MANY DAYS PER WEEK DO YOU ENGAGE IN MODERATE TO STRENUOUS EXERCISE (LIKE A BRISK WALK)?: 7 DAYS

## 2025-08-18 SDOH — ECONOMIC STABILITY: TRANSPORTATION INSECURITY
IN THE PAST 12 MONTHS, HAS LACK OF RELIABLE TRANSPORTATION KEPT YOU FROM MEDICAL APPOINTMENTS, MEETINGS, WORK OR FROM GETTING THINGS NEEDED FOR DAILY LIVING?: NO

## 2025-08-18 SDOH — HEALTH STABILITY: PHYSICAL HEALTH: ON AVERAGE, HOW MANY MINUTES DO YOU ENGAGE IN EXERCISE AT THIS LEVEL?: 150+ MIN

## 2025-08-18 SDOH — ECONOMIC STABILITY: TRANSPORTATION INSECURITY
IN THE PAST 12 MONTHS, HAS LACK OF TRANSPORTATION KEPT YOU FROM MEETINGS, WORK, OR FROM GETTING THINGS NEEDED FOR DAILY LIVING?: NO

## 2025-08-18 SDOH — ECONOMIC STABILITY: TRANSPORTATION INSECURITY
IN THE PAST 12 MONTHS, HAS THE LACK OF TRANSPORTATION KEPT YOU FROM MEDICAL APPOINTMENTS OR FROM GETTING MEDICATIONS?: NO

## 2025-08-18 SDOH — HEALTH STABILITY: MENTAL HEALTH
STRESS IS WHEN SOMEONE FEELS TENSE, NERVOUS, ANXIOUS, OR CAN'T SLEEP AT NIGHT BECAUSE THEIR MIND IS TROUBLED. HOW STRESSED ARE YOU?: TO SOME EXTENT

## 2025-08-18 ASSESSMENT — SOCIAL DETERMINANTS OF HEALTH (SDOH)
HOW OFTEN DO YOU ATTEND CHURCH OR RELIGIOUS SERVICES?: NEVER
DO YOU BELONG TO ANY CLUBS OR ORGANIZATIONS SUCH AS CHURCH GROUPS UNIONS, FRATERNAL OR ATHLETIC GROUPS, OR SCHOOL GROUPS?: NO
HOW OFTEN DO YOU ATTENT MEETINGS OF THE CLUB OR ORGANIZATION YOU BELONG TO?: NEVER
HOW OFTEN DO YOU HAVE A DRINK CONTAINING ALCOHOL: NEVER
WITHIN THE PAST 12 MONTHS, YOU WORRIED THAT YOUR FOOD WOULD RUN OUT BEFORE YOU GOT THE MONEY TO BUY MORE: NEVER TRUE
IN THE PAST 12 MONTHS, HAS THE ELECTRIC, GAS, OIL, OR WATER COMPANY THREATENED TO SHUT OFF SERVICE IN YOUR HOME?: NO
HOW OFTEN DO YOU ATTENT MEETINGS OF THE CLUB OR ORGANIZATION YOU BELONG TO?: NEVER
IN A TYPICAL WEEK, HOW MANY TIMES DO YOU TALK ON THE PHONE WITH FAMILY, FRIENDS, OR NEIGHBORS?: NEVER
IN A TYPICAL WEEK, HOW MANY TIMES DO YOU TALK ON THE PHONE WITH FAMILY, FRIENDS, OR NEIGHBORS?: NEVER
HOW MANY DRINKS CONTAINING ALCOHOL DO YOU HAVE ON A TYPICAL DAY WHEN YOU ARE DRINKING: PATIENT DOES NOT DRINK
HOW HARD IS IT FOR YOU TO PAY FOR THE VERY BASICS LIKE FOOD, HOUSING, MEDICAL CARE, AND HEATING?: NOT HARD AT ALL
HOW OFTEN DO YOU GET TOGETHER WITH FRIENDS OR RELATIVES?: NEVER
HOW OFTEN DO YOU HAVE SIX OR MORE DRINKS ON ONE OCCASION: NEVER
HOW OFTEN DO YOU ATTEND CHURCH OR RELIGIOUS SERVICES?: NEVER
DO YOU BELONG TO ANY CLUBS OR ORGANIZATIONS SUCH AS CHURCH GROUPS UNIONS, FRATERNAL OR ATHLETIC GROUPS, OR SCHOOL GROUPS?: NO
HOW OFTEN DO YOU GET TOGETHER WITH FRIENDS OR RELATIVES?: NEVER

## 2025-08-18 ASSESSMENT — LIFESTYLE VARIABLES
HOW OFTEN DO YOU HAVE SIX OR MORE DRINKS ON ONE OCCASION: NEVER
AUDIT-C TOTAL SCORE: 0
HOW OFTEN DO YOU HAVE A DRINK CONTAINING ALCOHOL: NEVER
HOW MANY STANDARD DRINKS CONTAINING ALCOHOL DO YOU HAVE ON A TYPICAL DAY: PATIENT DOES NOT DRINK
SKIP TO QUESTIONS 9-10: 1

## 2025-08-18 ASSESSMENT — PATIENT HEALTH QUESTIONNAIRE - PHQ9: CLINICAL INTERPRETATION OF PHQ2 SCORE: 0

## 2025-08-29 ENCOUNTER — TELEPHONE (OUTPATIENT)
Dept: HEALTH INFORMATION MANAGEMENT | Facility: OTHER | Age: 35
End: 2025-08-29
Payer: COMMERCIAL